# Patient Record
Sex: MALE | Race: WHITE | URBAN - METROPOLITAN AREA
[De-identification: names, ages, dates, MRNs, and addresses within clinical notes are randomized per-mention and may not be internally consistent; named-entity substitution may affect disease eponyms.]

---

## 2019-03-20 ENCOUNTER — RECORDS - HEALTHEAST (OUTPATIENT)
Dept: LAB | Facility: CLINIC | Age: 59
End: 2019-03-20

## 2019-03-20 LAB
ANION GAP SERPL CALCULATED.3IONS-SCNC: 13 MMOL/L (ref 5–18)
BUN SERPL-MCNC: 18 MG/DL (ref 8–22)
CALCIUM SERPL-MCNC: 9.4 MG/DL (ref 8.5–10.5)
CHLORIDE BLD-SCNC: 104 MMOL/L (ref 98–107)
CHOLEST SERPL-MCNC: 137 MG/DL
CO2 SERPL-SCNC: 25 MMOL/L (ref 22–31)
CREAT SERPL-MCNC: 1.06 MG/DL (ref 0.7–1.3)
FASTING STATUS PATIENT QL REPORTED: NO
GFR SERPL CREATININE-BSD FRML MDRD: >60 ML/MIN/1.73M2
GLUCOSE BLD-MCNC: 91 MG/DL (ref 70–125)
HDLC SERPL-MCNC: 41 MG/DL
LDLC SERPL CALC-MCNC: 68 MG/DL
POTASSIUM BLD-SCNC: 3.7 MMOL/L (ref 3.5–5)
PSA SERPL-MCNC: <0.1 NG/ML (ref 0–4.5)
SODIUM SERPL-SCNC: 142 MMOL/L (ref 136–145)
TRIGL SERPL-MCNC: 142 MG/DL

## 2020-06-10 ENCOUNTER — RECORDS - HEALTHEAST (OUTPATIENT)
Dept: LAB | Facility: CLINIC | Age: 60
End: 2020-06-10

## 2020-06-10 LAB
ANION GAP SERPL CALCULATED.3IONS-SCNC: 10 MMOL/L (ref 5–18)
BUN SERPL-MCNC: 18 MG/DL (ref 8–22)
CALCIUM SERPL-MCNC: 9.7 MG/DL (ref 8.5–10.5)
CHLORIDE BLD-SCNC: 106 MMOL/L (ref 98–107)
CHOLEST SERPL-MCNC: 108 MG/DL
CO2 SERPL-SCNC: 25 MMOL/L (ref 22–31)
CREAT SERPL-MCNC: 1.02 MG/DL (ref 0.7–1.3)
FASTING STATUS PATIENT QL REPORTED: ABNORMAL
GFR SERPL CREATININE-BSD FRML MDRD: >60 ML/MIN/1.73M2
GLUCOSE BLD-MCNC: 94 MG/DL (ref 70–125)
HDLC SERPL-MCNC: 37 MG/DL
LDLC SERPL CALC-MCNC: 48 MG/DL
POTASSIUM BLD-SCNC: 3.7 MMOL/L (ref 3.5–5)
SODIUM SERPL-SCNC: 141 MMOL/L (ref 136–145)
TRIGL SERPL-MCNC: 113 MG/DL

## 2021-06-03 ENCOUNTER — RECORDS - HEALTHEAST (OUTPATIENT)
Dept: ADMINISTRATIVE | Facility: CLINIC | Age: 61
End: 2021-06-03

## 2021-06-11 ENCOUNTER — RECORDS - HEALTHEAST (OUTPATIENT)
Dept: LAB | Facility: CLINIC | Age: 61
End: 2021-06-11

## 2021-06-11 LAB
ANION GAP SERPL CALCULATED.3IONS-SCNC: 11 MMOL/L (ref 5–18)
BUN SERPL-MCNC: 17 MG/DL (ref 8–22)
CALCIUM SERPL-MCNC: 8.8 MG/DL (ref 8.5–10.5)
CHLORIDE BLD-SCNC: 103 MMOL/L (ref 98–107)
CHOLEST SERPL-MCNC: 139 MG/DL
CO2 SERPL-SCNC: 26 MMOL/L (ref 22–31)
CREAT SERPL-MCNC: 0.98 MG/DL (ref 0.7–1.3)
FASTING STATUS PATIENT QL REPORTED: ABNORMAL
GFR SERPL CREATININE-BSD FRML MDRD: >60 ML/MIN/1.73M2
GLUCOSE BLD-MCNC: 95 MG/DL (ref 70–125)
HDLC SERPL-MCNC: 33 MG/DL
LDLC SERPL CALC-MCNC: 71 MG/DL
POTASSIUM BLD-SCNC: 3.9 MMOL/L (ref 3.5–5)
SODIUM SERPL-SCNC: 140 MMOL/L (ref 136–145)
TRIGL SERPL-MCNC: 176 MG/DL

## 2022-06-22 ENCOUNTER — LAB REQUISITION (OUTPATIENT)
Dept: LAB | Facility: CLINIC | Age: 62
End: 2022-06-22

## 2022-06-22 DIAGNOSIS — I10 ESSENTIAL (PRIMARY) HYPERTENSION: ICD-10-CM

## 2022-06-22 DIAGNOSIS — E78.5 HYPERLIPIDEMIA, UNSPECIFIED: ICD-10-CM

## 2022-06-22 PROCEDURE — 80061 LIPID PANEL: CPT | Performed by: FAMILY MEDICINE

## 2022-06-22 PROCEDURE — 80048 BASIC METABOLIC PNL TOTAL CA: CPT | Performed by: FAMILY MEDICINE

## 2022-06-23 LAB
ANION GAP SERPL CALCULATED.3IONS-SCNC: 9 MMOL/L (ref 5–18)
BUN SERPL-MCNC: 16 MG/DL (ref 8–22)
CALCIUM SERPL-MCNC: 9.5 MG/DL (ref 8.5–10.5)
CHLORIDE BLD-SCNC: 103 MMOL/L (ref 98–107)
CHOLEST SERPL-MCNC: 138 MG/DL
CO2 SERPL-SCNC: 28 MMOL/L (ref 22–31)
CREAT SERPL-MCNC: 0.95 MG/DL (ref 0.7–1.3)
FASTING STATUS PATIENT QL REPORTED: NORMAL
GFR SERPL CREATININE-BSD FRML MDRD: 89 ML/MIN/1.73M2
GLUCOSE BLD-MCNC: 91 MG/DL (ref 70–125)
HDLC SERPL-MCNC: 43 MG/DL
LDLC SERPL CALC-MCNC: 66 MG/DL
POTASSIUM BLD-SCNC: 3.8 MMOL/L (ref 3.5–5)
SODIUM SERPL-SCNC: 140 MMOL/L (ref 136–145)
TRIGL SERPL-MCNC: 145 MG/DL

## 2023-08-02 ENCOUNTER — LAB REQUISITION (OUTPATIENT)
Dept: LAB | Facility: CLINIC | Age: 63
End: 2023-08-02

## 2023-08-02 DIAGNOSIS — I10 ESSENTIAL (PRIMARY) HYPERTENSION: ICD-10-CM

## 2023-08-02 LAB
ANION GAP SERPL CALCULATED.3IONS-SCNC: 13 MMOL/L (ref 7–15)
BUN SERPL-MCNC: 19.2 MG/DL (ref 8–23)
CALCIUM SERPL-MCNC: 9.9 MG/DL (ref 8.8–10.2)
CHLORIDE SERPL-SCNC: 103 MMOL/L (ref 98–107)
CHOLEST SERPL-MCNC: 152 MG/DL
CREAT SERPL-MCNC: 1.05 MG/DL (ref 0.67–1.17)
DEPRECATED HCO3 PLAS-SCNC: 25 MMOL/L (ref 22–29)
GFR SERPL CREATININE-BSD FRML MDRD: 79 ML/MIN/1.73M2
GLUCOSE SERPL-MCNC: 96 MG/DL (ref 70–99)
HDLC SERPL-MCNC: 47 MG/DL
LDLC SERPL CALC-MCNC: 85 MG/DL
NONHDLC SERPL-MCNC: 105 MG/DL
POTASSIUM SERPL-SCNC: 3.9 MMOL/L (ref 3.4–5.3)
SODIUM SERPL-SCNC: 141 MMOL/L (ref 136–145)
TRIGL SERPL-MCNC: 101 MG/DL

## 2023-08-02 PROCEDURE — 80048 BASIC METABOLIC PNL TOTAL CA: CPT | Performed by: FAMILY MEDICINE

## 2023-08-02 PROCEDURE — 80061 LIPID PANEL: CPT | Performed by: FAMILY MEDICINE

## 2023-12-07 ENCOUNTER — TRANSFERRED RECORDS (OUTPATIENT)
Dept: HEALTH INFORMATION MANAGEMENT | Facility: CLINIC | Age: 63
End: 2023-12-07

## 2024-12-09 ENCOUNTER — TRANSFERRED RECORDS (OUTPATIENT)
Dept: HEALTH INFORMATION MANAGEMENT | Facility: CLINIC | Age: 64
End: 2024-12-09

## 2024-12-10 ENCOUNTER — MEDICAL CORRESPONDENCE (OUTPATIENT)
Dept: HEALTH INFORMATION MANAGEMENT | Facility: CLINIC | Age: 64
End: 2024-12-10

## 2024-12-17 ENCOUNTER — TRANSCRIBE ORDERS (OUTPATIENT)
Dept: OTHER | Age: 64
End: 2024-12-17

## 2024-12-17 ENCOUNTER — TRANSFERRED RECORDS (OUTPATIENT)
Dept: HEALTH INFORMATION MANAGEMENT | Facility: CLINIC | Age: 64
End: 2024-12-17

## 2024-12-17 ENCOUNTER — PATIENT OUTREACH (OUTPATIENT)
Dept: ONCOLOGY | Facility: CLINIC | Age: 64
End: 2024-12-17

## 2024-12-17 DIAGNOSIS — D75.1 POLYCYTHEMIA: Primary | ICD-10-CM

## 2024-12-17 NOTE — PROGRESS NOTES
New Patient Oncology Nurse Navigator Note     Referral Received: 12/17/24      Referring provider: Marcus Vega MD     Referring Clinic/Organization: Other - [facility]      Referred to: Benign Hematology    Requested provider (if applicable): First available - did not specify      Evaluation for :   Diagnosis   D75.1 (ICD-10-CM) - Polycythemia      Clinical History (per Nurse review of records provided):          Records Location: No records received     Records Needed:     Labs and OV notes from referring.    Additional testing needed prior to consult:     ?    Referral updates and Plan:     12/17/2024 3:07 PM -  Referral received and reviewed. Records requested.  Sent to NPS to schedule next available.     María Elena Suresh, DINESHN, RN  Hematology/Oncology Nurse Navigator  [facility]  [phone] / [phone]

## 2024-12-18 ENCOUNTER — PRE VISIT (OUTPATIENT)
Dept: ONCOLOGY | Facility: CLINIC | Age: 64
End: 2024-12-18

## 2024-12-18 NOTE — TELEPHONE ENCOUNTER
Action December 19, 2024 8:05 AM ABT   Action Taken Records from [facility] Source received and sent to HIM for upload and NN email for review     Action December 18, 2024 10:19 AM ABT   Action Taken Called [facility] [phone] - option 6 medical records - spoke to Ameya, informed and instructed to fax request to [facility] fax number [phone]     RECORDS STATUS - ALL OTHER DIAGNOSIS      RECORDS RECEIVED FROM: [facility]   NOTES STATUS DETAILS   OFFICE NOTE from referring provider External: [facility] 12/09/24: Dr. Marcus Vega   MEDICATION LIST External: [facility]    LABS     PATHOLOGY REPORTS     ANYTHING RELATED TO DIAGNOSIS External: [facility] Most recent 12/09/24

## 2024-12-20 PROBLEM — Z80.0 FAMILY HISTORY OF COLON CANCER: Status: RESOLVED | Noted: 2024-12-20 | Resolved: 2024-12-20

## 2024-12-20 PROBLEM — Z80.0 FAMILY HISTORY OF COLON CANCER: Status: ACTIVE | Noted: 2024-12-20

## 2025-04-04 ENCOUNTER — TRANSFERRED RECORDS (OUTPATIENT)
Dept: HEALTH INFORMATION MANAGEMENT | Facility: CLINIC | Age: 65
End: 2025-04-04
Payer: COMMERCIAL

## 2025-04-07 ENCOUNTER — TRANSFERRED RECORDS (OUTPATIENT)
Dept: HEALTH INFORMATION MANAGEMENT | Facility: CLINIC | Age: 65
End: 2025-04-07
Payer: COMMERCIAL

## 2025-05-13 ENCOUNTER — TRANSCRIBE ORDERS (OUTPATIENT)
Dept: OTHER | Age: 65
End: 2025-05-13

## 2025-05-13 DIAGNOSIS — N39.3 STRESS INCONTINENCE (FEMALE) (MALE): Primary | ICD-10-CM

## 2025-06-09 ENCOUNTER — ONCOLOGY VISIT (OUTPATIENT)
Dept: ONCOLOGY | Facility: HOSPITAL | Age: 65
End: 2025-06-09
Attending: INTERNAL MEDICINE
Payer: COMMERCIAL

## 2025-06-09 ENCOUNTER — LAB (OUTPATIENT)
Dept: INFUSION THERAPY | Facility: HOSPITAL | Age: 65
End: 2025-06-09
Attending: INTERNAL MEDICINE
Payer: COMMERCIAL

## 2025-06-09 VITALS
BODY MASS INDEX: 27.89 KG/M2 | DIASTOLIC BLOOD PRESSURE: 92 MMHG | OXYGEN SATURATION: 97 % | SYSTOLIC BLOOD PRESSURE: 121 MMHG | TEMPERATURE: 97.2 F | RESPIRATION RATE: 16 BRPM | WEIGHT: 229 LBS | HEIGHT: 76 IN | HEART RATE: 55 BPM

## 2025-06-09 DIAGNOSIS — D75.1 POLYCYTHEMIA: ICD-10-CM

## 2025-06-09 DIAGNOSIS — D75.1 POLYCYTHEMIA: Primary | ICD-10-CM

## 2025-06-09 LAB
BASOPHILS # BLD AUTO: 0.1 10E3/UL (ref 0–0.2)
BASOPHILS NFR BLD AUTO: 1 %
EOSINOPHIL # BLD AUTO: 0.3 10E3/UL (ref 0–0.7)
EOSINOPHIL NFR BLD AUTO: 6 %
ERYTHROCYTE [DISTWIDTH] IN BLOOD BY AUTOMATED COUNT: 12.9 % (ref 10–15)
HCT VFR BLD AUTO: 48.2 % (ref 40–53)
HGB BLD-MCNC: 16.5 G/DL (ref 13.3–17.7)
IMM GRANULOCYTES # BLD: 0 10E3/UL
IMM GRANULOCYTES NFR BLD: 0 %
LYMPHOCYTES # BLD AUTO: 2.2 10E3/UL (ref 0.8–5.3)
LYMPHOCYTES NFR BLD AUTO: 36 %
MCH RBC QN AUTO: 30.2 PG (ref 26.5–33)
MCHC RBC AUTO-ENTMCNC: 34.2 G/DL (ref 31.5–36.5)
MCV RBC AUTO: 88 FL (ref 78–100)
MONOCYTES # BLD AUTO: 0.5 10E3/UL (ref 0–1.3)
MONOCYTES NFR BLD AUTO: 8 %
NEUTROPHILS # BLD AUTO: 2.9 10E3/UL (ref 1.6–8.3)
NEUTROPHILS NFR BLD AUTO: 49 %
NRBC # BLD AUTO: 0 10E3/UL
NRBC BLD AUTO-RTO: 0 /100
PLATELET # BLD AUTO: 192 10E3/UL (ref 150–450)
RBC # BLD AUTO: 5.46 10E6/UL (ref 4.4–5.9)
WBC # BLD AUTO: 5.9 10E3/UL (ref 4–11)

## 2025-06-09 PROCEDURE — G0452 MOLECULAR PATHOLOGY INTERPR: HCPCS | Mod: 26 | Performed by: PATHOLOGY

## 2025-06-09 PROCEDURE — 81339 MPL GENE SEQ ALYS EXON 10: CPT | Performed by: INTERNAL MEDICINE

## 2025-06-09 PROCEDURE — 81270 JAK2 GENE: CPT | Performed by: INTERNAL MEDICINE

## 2025-06-09 PROCEDURE — 99204 OFFICE O/P NEW MOD 45 MIN: CPT | Performed by: INTERNAL MEDICINE

## 2025-06-09 PROCEDURE — 36415 COLL VENOUS BLD VENIPUNCTURE: CPT

## 2025-06-09 PROCEDURE — 85025 COMPLETE CBC W/AUTO DIFF WBC: CPT

## 2025-06-09 PROCEDURE — G0463 HOSPITAL OUTPT CLINIC VISIT: HCPCS | Performed by: INTERNAL MEDICINE

## 2025-06-09 RX ORDER — AZELASTINE HYDROCHLORIDE 137 UG/1
SPRAY, METERED NASAL PRN
COMMUNITY
Start: 2024-10-17

## 2025-06-09 RX ORDER — VALSARTAN 40 MG/1
40 TABLET ORAL DAILY
COMMUNITY
Start: 2010-01-01

## 2025-06-09 RX ORDER — MULTIVITAMIN
1 TABLET ORAL DAILY
COMMUNITY

## 2025-06-09 RX ORDER — TOLTERODINE 2 MG/1
2 CAPSULE, EXTENDED RELEASE ORAL DAILY
COMMUNITY

## 2025-06-09 RX ORDER — ATORVASTATIN CALCIUM 80 MG/1
80 TABLET, FILM COATED ORAL DAILY
COMMUNITY
Start: 2010-01-01

## 2025-06-09 ASSESSMENT — PAIN SCALES - GENERAL: PAINLEVEL_OUTOF10: NO PAIN (0)

## 2025-06-09 NOTE — PROGRESS NOTES
"Oncology Rooming Note    June 9, 2025 11:15 AM   Juan Curran is a 65 year old male who presents for:    Chief Complaint   Patient presents with    Hematology     New consult Polycythemia      Initial Vitals: BP (!) 121/92 (BP Location: Right arm, Patient Position: Sitting, Cuff Size: Adult Regular)   Pulse 55   Temp 97.2  F (36.2  C) (Tympanic)   Resp 16   Ht 1.93 m (6' 4\")   Wt 103.9 kg (229 lb)   SpO2 97%   BMI 27.87 kg/m   Estimated body mass index is 27.87 kg/m  as calculated from the following:    Height as of this encounter: 1.93 m (6' 4\").    Weight as of this encounter: 103.9 kg (229 lb). Body surface area is 2.36 meters squared.  No Pain (0) Comment: Data Unavailable   No LMP for male patient.  Allergies reviewed: Yes  Medications reviewed: Yes    Medications: Medication refills not needed today.  Pharmacy name entered into [employer]: [id number] - [place], [place] - [address]    Frailty Screening:   Is the patient here for a new oncology consult visit in cancer care? 1. Yes. Over the past month, have you experienced difficulty or required a caregiver to assist with:   1. Balance, walking or general mobility (including any falls)? NO  2. Completion of self-care tasks such as bathing, dressing, toileting, grooming/hygiene?  NO  3. Concentration or memory that affects your daily life?  NO     PHQ9:  Did this patient require a PHQ9?: No      Clinical concerns:  new consult polycythemia       Esha Bowman            "

## 2025-06-09 NOTE — PROGRESS NOTES
[facility] Hematology and Oncology Consult Note    Patient: Juan Curran  MRN: [id number]  Date of Service: Jun 9, 2025       Reason for Visit    Chief Complaint   Patient presents with    Hematology     New consult Polycythemia              ECOG Performance 0 - Independent        _____________________________________________________________________________    History Of Present Illness    Mr. Juan Curran is a very pleasant 65 year old male who has been referred to us because of an elevated hemoglobin.  According to the patient he has noticed an elevated hemoglobin for the last few years and it has been around the 18 g range.  Patient has had a diagnosis of obstructive sleep sleep apnea for many years and uses CPAP machine on a regular basis.  He otherwise is in good health and denies any other concerns.  He has no history of smoking and no history of heart disease.  His appetite is good his energy level is fine he is active and spends time in the gym 4 to 5 days a week        Review of systems.  Apart from describing in history, the remainder of comprehensive ROS was negative.    Past History    No past medical history on file.  No past surgical history on file.  No family history on file.  Social History     Socioeconomic History    Marital status:      Social Drivers of Health     Financial Resource Strain: Low Risk  (2/9/2022)    Received from [facility]    Overall Financial Resource Strain (CARDIA)     Difficulty of Paying Living Expenses: Not hard at all   Food Insecurity: No Food Insecurity (10/24/2024)    Received from [facility]    Hunger Vital Sign     Worried About Running Out of Food in the Last Year: Never true     Ran Out of Food in the Last Year: Never true   Transportation Needs: No Transportation Needs (10/24/2024)    Received from [facility]    PRAPARE - Transportation     Lack of Transportation (Medical): No     Lack of Transportation (Non-Medical): No   Physical Activity:  "Sufficiently Active (10/24/2024)    Received from [facility]    Exercise Vital Sign     Days of Exercise per Week: 4 days     Minutes of Exercise per Session: 60 min   Stress: No Stress Concern Present (2/9/2022)    Received from [facility]    [place] [place] of [employer] - Occupational Stress Questionnaire     Feeling of Stress : Not at all   Social Connections: Unknown (9/18/2022)    Received from [facility]    Social Connection and Isolation Panel [NHANES]     Frequency of Communication with Friends and Family: More than three times a week     Frequency of Social Gatherings with Friends and Family: More than three times a week     Attends Moravian Services: More than 4 times per year     Attends Club or Organization Meetings: 1 to 4 times per year   Interpersonal Safety: Not At Risk (2/9/2022)    Received from [facility]    Humiliation, Afraid, Rape, and Kick questionnaire     Fear of Current or Ex-Partner: No     Emotionally Abused: No     Physically Abused: No     Sexually Abused: No   Housing Stability: Low Risk  (10/24/2024)    Received from [facility]    Housing Stability     What is your living situation today?: I have a steady place to live       Allergies    Allergies   Allergen Reactions    Pollen Extract Difficulty breathing     Congestion, sneezing.    Seasonal Allergies        Physical Exam    BP (!) 121/92 (BP Location: Right arm, Patient Position: Sitting, Cuff Size: Adult Regular)   Pulse 55   Temp 97.2  F (36.2  C) (Tympanic)   Resp 16   Ht 1.93 m (6' 4\")   Wt 103.9 kg (229 lb)   SpO2 97%   BMI 27.87 kg/m      General: alert, awake, not in acute distress  HEENT: Head: Normal, normocephalic, atraumatic.  Eye: Normal external eye, conjunctiva, lids cornea, SAMANTHA.  Nose: Normal external nose, mucus membranes and septum.  Pharynx: Normal buccal mucosa. Normal pharynx.  Neck / Thyroid: Supple, no masses, nodes, nodules or enlargement.  Lymphatics: No abnormally enlarged lymph " nodes.  Chest: Normal chest wall and respirations. Clear to auscultation.  Heart: S1 S2 RRR, no murmur.   Abdomen: abdomen is soft without significant tenderness, masses, organomegaly or guarding  Extremities: normal strength, tone, and muscle mass  Skin: normal. no rash or abnormalities  CNS: non focal.    Lab Results    No results found for this or any previous visit (from the past week).    Imaging Results    No results found.        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  There is an order CBC in the system from September when it was 17 and again the white count and the platelet count was in the low normal range.  The etiology of his polycythemia was discussed with the patient in detail.  Patient does has obstructive sleep apnea even though he wears a CPAP machine religiously I do think that the elevated hemoglobin most likely due to that.  I will however like to rule out primary polycythemia rubra vera and do a JAK2 mutation panel.  If that is negative no further intervention will be needed and we will discharge him from the hematology service.  This was all discussed with him and his questions were answered.  Will draw those labs today and we will call him with the results.  Follow-up will depend on the results of the above-mentioned testing.  Patient was given an opportunity to answer questions which were answered to his satisfaction.        Signed by: Ata Sepulveda MD

## 2025-06-09 NOTE — LETTER
"6/9/2025      Juan Curran  [address] [place] [address]      Dear Colleague,    Thank you for referring your patient, Juan Curran, to the [facility]. Please see a copy of my visit note below.    Oncology Rooming Note    June 9, 2025 11:15 AM   Juan Curran is a 65 year old male who presents for:    Chief Complaint   Patient presents with     Hematology     New consult Polycythemia      Initial Vitals: BP (!) 121/92 (BP Location: Right arm, Patient Position: Sitting, Cuff Size: Adult Regular)   Pulse 55   Temp 97.2  F (36.2  C) (Tympanic)   Resp 16   Ht 1.93 m (6' 4\")   Wt 103.9 kg (229 lb)   SpO2 97%   BMI 27.87 kg/m   Estimated body mass index is 27.87 kg/m  as calculated from the following:    Height as of this encounter: 1.93 m (6' 4\").    Weight as of this encounter: 103.9 kg (229 lb). Body surface area is 2.36 meters squared.  No Pain (0) Comment: Data Unavailable   No LMP for male patient.  Allergies reviewed: Yes  Medications reviewed: Yes    Medications: Medication refills not needed today.  Pharmacy name entered into [employer]: [id number] - [place], [place] - [address]    Frailty Screening:   Is the patient here for a new oncology consult visit in cancer care? 1. Yes. Over the past month, have you experienced difficulty or required a caregiver to assist with:   1. Balance, walking or general mobility (including any falls)? NO  2. Completion of self-care tasks such as bathing, dressing, toileting, grooming/hygiene?  NO  3. Concentration or memory that affects your daily life?  NO     PHQ9:  Did this patient require a PHQ9?: No      Clinical concerns:  new consult polycythemia       Esha Bowman              [facility] Hematology and Oncology Consult Note    Patient: Juan Curran  MRN: [id number]  Date of Service: Jun 9, 2025       Reason for Visit    Chief Complaint   Patient presents with     Hematology     New consult Polycythemia  "             ECOG Performance 0 - Independent        _____________________________________________________________________________    History Of Present Illness    Mr. Juan Curran is a very pleasant 65 year old male who has been referred to us because of an elevated hemoglobin.  According to the patient he has noticed an elevated hemoglobin for the last few years and it has been around the 18 g range.  Patient has had a diagnosis of obstructive sleep sleep apnea for many years and uses CPAP machine on a regular basis.  He otherwise is in good health and denies any other concerns.  He has no history of smoking and no history of heart disease.  His appetite is good his energy level is fine he is active and spends time in the gym 4 to 5 days a week        Review of systems.  Apart from describing in history, the remainder of comprehensive ROS was negative.    Past History    No past medical history on file.  No past surgical history on file.  No family history on file.  Social History     Socioeconomic History     Marital status:      Social Drivers of Health     Financial Resource Strain: Low Risk  (2/9/2022)    Received from [facility]    Overall Financial Resource Strain (CARDIA)      Difficulty of Paying Living Expenses: Not hard at all   Food Insecurity: No Food Insecurity (10/24/2024)    Received from [facility]    Hunger Vital Sign      Worried About Running Out of Food in the Last Year: Never true      Ran Out of Food in the Last Year: Never true   Transportation Needs: No Transportation Needs (10/24/2024)    Received from [facility]    PRAPARE - Transportation      Lack of Transportation (Medical): No      Lack of Transportation (Non-Medical): No   Physical Activity: Sufficiently Active (10/24/2024)    Received from [facility]    Exercise Vital Sign      Days of Exercise per Week: 4 days      Minutes of Exercise per Session: 60 min   Stress: No Stress Concern Present (2/9/2022)    Received from  "[facility]    [employer] of Occupational Health - Occupational Stress Questionnaire      Feeling of Stress : Not at all   Social Connections: Unknown (9/18/2022)    Received from [facility]    Social Connection and Isolation Panel [NHANES]      Frequency of Communication with Friends and Family: More than three times a week      Frequency of Social Gatherings with Friends and Family: More than three times a week      Attends Taoism Services: More than 4 times per year      Attends Club or Organization Meetings: 1 to 4 times per year   Interpersonal Safety: Not At Risk (2/9/2022)    Received from [facility]    Humiliation, Afraid, Rape, and Kick questionnaire      Fear of Current or Ex-Partner: No      Emotionally Abused: No      Physically Abused: No      Sexually Abused: No   Housing Stability: Low Risk  (10/24/2024)    Received from [facility]    Housing Stability      What is your living situation today?: I have a steady place to live       Allergies    Allergies   Allergen Reactions     Pollen Extract Difficulty breathing     Congestion, sneezing.     Seasonal Allergies        Physical Exam    BP (!) 121/92 (BP Location: Right arm, Patient Position: Sitting, Cuff Size: Adult Regular)   Pulse 55   Temp 97.2  F (36.2  C) (Tympanic)   Resp 16   Ht 1.93 m (6' 4\")   Wt 103.9 kg (229 lb)   SpO2 97%   BMI 27.87 kg/m      General: alert, awake, not in acute distress  HEENT: Head: Normal, normocephalic, atraumatic.  Eye: Normal external eye, conjunctiva, lids cornea, SAMANTHA.  Nose: Normal external nose, mucus membranes and septum.  Pharynx: Normal buccal mucosa. Normal pharynx.  Neck / Thyroid: Supple, no masses, nodes, nodules or enlargement.  Lymphatics: No abnormally enlarged lymph nodes.  Chest: Normal chest wall and respirations. Clear to auscultation.  Heart: S1 S2 RRR, no murmur.   Abdomen: abdomen is soft without significant tenderness, masses, organomegaly or guarding  Extremities: normal " strength, tone, and muscle mass  Skin: normal. no rash or abnormalities  CNS: non focal.    Lab Results    No results found for this or any previous visit (from the past week).    Imaging Results    No results found.        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  There is an order CBC in the system from September when it was 17 and again the white count and the platelet count was in the low normal range.  The etiology of his polycythemia was discussed with the patient in detail.  Patient does has obstructive sleep apnea even though he wears a CPAP machine religiously I do think that the elevated hemoglobin most likely due to that.  I will however like to rule out primary polycythemia rubra vera and do a JAK2 mutation panel.  If that is negative no further intervention will be needed and we will discharge him from the hematology service.  This was all discussed with him and his questions were answered.  Will draw those labs today and we will call him with the results.  Follow-up will depend on the results of the above-mentioned testing.  Patient was given an opportunity to answer questions which were answered to his satisfaction.        Signed by: Ata Sepulveda MD     Again, thank you for allowing me to participate in the care of your patient.        Sincerely,        Ata Sepulveda MD    Electronically signed

## 2025-06-18 LAB
INTERPRETATION SERPL IEP-IMP: NORMAL
LAB TEST RESULTS REPORTED IN RPTPERIOD: NORMAL
SEQUENCING METHOD PNL BLD/T: NORMAL
SPECIMEN TYPE: NORMAL

## 2025-06-20 ENCOUNTER — PATIENT OUTREACH (OUTPATIENT)
Dept: ONCOLOGY | Facility: HOSPITAL | Age: 65
End: 2025-06-20
Payer: COMMERCIAL

## 2025-06-20 NOTE — PROGRESS NOTES
"[facility]/Voicemail    Clinical Data: Care Coordinator Outreach    Outreach attempted x 1.  Left message on patient's voicemail with call back information and requested return call.    Plan: Care Coordinator will try to reach patient again in 3-5 business days.    Of Note: Dr. Sepulveda was able to review Juan's lab results and states \"CAN 2 CALr MPL negative. No further follow-up required in hematoilogy\".    Geni Puentes RN  06/20/25  3:03 PM     "

## 2025-06-23 NOTE — PROGRESS NOTES
"Incoming call from Juan.     Informed Juan that Dr. Sepulveda was able to review his test results. Per Dr. Sepulveda: \"CAN 2 CALr MPL negative. No further follow-up required in hematoilogy\". Juan verbalizes understanding and thanks this writer for the \"good news\". Encouraged Juan to reach out if any needs arise in the future. Juan is agreeable to this.     Geni Puentes RN  06/23/25  11:44 AM     "

## 2025-06-30 ENCOUNTER — THERAPY VISIT (OUTPATIENT)
Dept: PHYSICAL THERAPY | Facility: CLINIC | Age: 65
End: 2025-06-30
Payer: COMMERCIAL

## 2025-06-30 DIAGNOSIS — N39.3 STRESS INCONTINENCE OF URINE: Primary | ICD-10-CM

## 2025-06-30 PROCEDURE — 97161 PT EVAL LOW COMPLEX 20 MIN: CPT | Mod: GP

## 2025-06-30 PROCEDURE — 97110 THERAPEUTIC EXERCISES: CPT | Mod: GP

## 2025-06-30 PROCEDURE — 97530 THERAPEUTIC ACTIVITIES: CPT | Mod: GP

## 2025-06-30 NOTE — PROGRESS NOTES
PHYSICAL THERAPY EVALUATION  Type of Visit: Evaluation       Fall Risk Screen:  Have you fallen 2 or more times in the past year?: No  Have you fallen and had an injury in the past year?: No  Is patient receiving Physical Therapy Services?: Yes (for pelvic PT)    Subjective         Presenting condition or subjective complaint: incontinence    Patient presents to physical therapy for evaluation. Patient reports that symptoms began in 2008 after prostatectomy. He reports that symptoms have been getting progressively worse over the last few years, especially in the last 6 months. Reports was placed on medication with little improvement - although has been moving from a house to an apartment, so he has been doing a higher level of activity. Reports wearing 5 pads per day that are fairly full. He reports that he has tried kegels in the past with little improvement. His goal for PT would be to decrease how many pads he is using each day.    Date of onset: 05/13/25 (referral date)    Relevant medical history:     No past medical history on file.   Dates & types of surgery:    No past surgical history on file.   Prior diagnostic imaging/testing results:       Prior therapy history for the same diagnosis, illness or injury: Yes unknown    Prior Level of Function  Transfers: Independent  Ambulation: Independent  ADL: Independent  IADL:     Living Environment  Social support: With a significant other or spouse   Type of home: House   Stairs to enter the home: Yes 3 Is there a railing: Yes     Ramp: Yes   Stairs inside the home: Yes 12 Is there a railing: Yes     Help at home: None  Equipment owned:       Employment: No    Hobbies/Interests:      Patient goals for therapy: wear less pads per day    Pain assessment: Pain denied     Objective      PELVIC EVALUATION  ADDITIONAL HISTORY:  Sex assigned at birth: Male  Gender identity: Male    Pronouns: He/Him/His      Bladder History:  Patient denies pain with urination. Patient  reports that he feels completely empty, but will get another urge shortly after voiding. Reports decreased sense of urges since prostatectomy.  Feels bladder filling: No  Triggers for feeling of inability to wait to go to the bathroom: Yes milk, coke, alcohol  How long can you wait to urinate: 10 minutes, reports waiting a few hours between trips to the bathroom  Gets up at night to urinate: Yes 1  Can stop the flow of urine when urinating: Sometimes  Volume of urine usually released: Small   Other issues:     Number of bladder infections in last 12 months:    Fluid intake per day: 32 water 12 caffeine    Medications taken for bladder: No     Activities causing urine leak: Cough; Sneeze; Jump; Run; Hurrying to the bathroom due to a strong urge to urinate (pee)   heavier lifting, golfing, stress/nervousness, exercise, sudden movement  Amount of urine typically leaked:    Pads used to help with leaking: Yes I use this many pads per day: 5   I use this type/brand: assure      Bowel History:  Reports complete emptying with bowel movements for the last month. Reports that the new bladder medication did increase constipation - has been resolved for the past month.  Frequency of bowel movement: daily  Consistency of stool: Hard   type 5  Ignores the urge to defecate: No  Other bowel issues:   some slight straining with bowel movements  Length of time spent trying to have a bowel movement:      Sexual Function History:  Sexual orientation: Straight    Sexually active: Yes  Lubrication used: Yes Yes  Pelvic pain:       Pain or difficulty with orgasms/erection/ejaculation: No   does not report any difficulty achieving or maintaining erection  State of menopause:    Hormone medications: No      Are you currently pregnant: No  Have you been diagnosed with pelvic prolapse or abdominal separation: No  Do you get regular exercise: Yes  I do this type of exercise: aerobic  Have you tried pelvic floor strengthening exercises for 4  weeks: Yes  Do you have any history of trauma that is relevant to your care that you d like to share: No    Discussed reason for referral regarding pelvic health needs and external/internal pelvic floor muscle examination with patient/guardian.  Opportunity provided to ask questions and verbal consent for assessment and intervention was given.    PAIN: patient does not report pain with urinary symptoms, information below is related to incontinence  Pain Level at Rest: 0/10  Pain Level with Use: 5/10  Pain Location: stress urinary incontinence  Pain Quality: stress urinary incontinence  Pain Frequency: daily  Pain is Worst: daytime  Pain is Exacerbated By: lifting, golfing, coughing/sneezes  Pain is Relieved By: none  Pain Progression: Worsened    POSTURE: Standing Posture: Rounded shoulders, Forward head, Thoracic kyphosis increased  Sitting Posture: Rounded shoulders, Forward head, Thoracic kyphosis increased  LUMBAR SCREEN: AROM WFL  HIP SCREEN:  Strength: WNL   Functional Strength Testing:     PELVIC/SI SCREEN:     PAIN PROVOCATION TEST:   PELVIS/SI SPECIAL TESTS:   BREATHING SYMMETRY:     PELVIC EXAM  External Visual Inspection:  At rest: Normal  With voluntary pelvic floor contraction: Perineal elevation, Present  Relaxation of PFM: Yes  With intra-abdominal pressure: Bearing down as defecation: Perineal elevation    Integumentary:   Anal: Discharge    External Digital Palpation per Perineum:   Levator ani: Unremarkable  Coccyx: Unremarkable    Scar:   Location/Type:   Mobility:     Internal Digital Palpation:  Per Vagina:      Per Rectum:        Pelvic Organ Prolapse:       ABDOMINAL ASSESSMENT  Diastasis Rectus Abdominis (BLANE):      Abdominal Activation/Strength:     Scar:   Location/Type:   Mobility:     Fascial Tension/Restriction:     BIOFEEDBACK:  Position: Sidelying  Surface Electrodes: Perineal    Abdominals: Synergistic with pelvic floor    Perianals:   Baseline muscle activity: WNL, below 3.0 uV  Peak  Amplitude/MVC: see 2nd graph microvolts  Duration of Sustained Contraction: see graphs  Endurance Hold-Average mean amplitude/work average: see graphs  Endurance Hold-Baseline between contractions: see graphs, intermittently elevated  Quick Flicks-Average mean amplitude/work average: see graphs  Quick Flicks-Baseline between contractions: see graphs, intermittently elevated  Subjective Assessment: Time to command to Peak: see graphs  Subjective Assessment: Time to return to baseline muscle activity: see graphs    DERMATOMES:   DTR S:     Assessment & Plan   CLINICAL IMPRESSIONS  Medical Diagnosis: Stress incontinence (female) (male)    Treatment Diagnosis: pelvic floor muscle weakness, pelvic floor muscle incoordination   Impression/Assessment: Patient is a 65 year old male with mixed urinary incontinence complaints.  The following significant findings have been identified: Decreased ROM/flexibility, Decreased strength, Decreased proprioception, Impaired muscle performance, Decreased activity tolerance, and Impaired posture. These impairments interfere with their ability to perform self care tasks, recreational activities, household chores, household mobility, and community mobility as compared to previous level of function.     Clinical Decision Making (Complexity):  Clinical Presentation: Stable/Uncomplicated  Clinical Presentation Rationale: based on medical and personal factors listed in PT evaluation  Clinical Decision Making (Complexity): Low complexity    PLAN OF CARE  Treatment Interventions:  Modalities: Biofeedback, Ultrasound  Interventions: Manual Therapy, Neuromuscular Re-education, Therapeutic Activity, Therapeutic Exercise, Self-Care/Home Management    Long Term Goals     PT Goal 1  Goal Identifier: LTG1  Goal Description: Patient will demonstrate increase in pelvic floor muscle strength to decrease use of pads to 2 per day.  Rationale: to maximize safety and independence with performance of ADLs and  functional tasks  Goal Progress: currently 5 per day  Target Date: 09/27/25      Frequency of Treatment: 1x per week for 3 weeks, 1x every other week for 9 weeks  Duration of Treatment: 12 weeks    Recommended Referrals to Other Professionals:   Education Assessment:   Learner/Method: Patient    Risks and benefits of evaluation/treatment have been explained.   Patient/Family/caregiver agrees with Plan of Care.     Evaluation Time:     PT Eval, Low Complexity Minutes (35078): 20       Signing Clinician: Camron Carl, PT        [facility]                                                                                   OUTPATIENT PHYSICAL THERAPY      PLAN OF TREATMENT FOR OUTPATIENT REHABILITATION   Patient's Last Name, First Name, Jaun Wetzel YOB: 1960   Provider's Name   [facility]   Medical Record No.  [id number]     Onset Date: 05/13/25 (referral date)  Start of Care Date: 06/30/25     Medical Diagnosis:  Stress incontinence (female) (male)      PT Treatment Diagnosis:  pelvic floor muscle weakness, pelvic floor muscle incoordination Plan of Treatment  Frequency/Duration: 1x per week for 3 weeks, 1x every other week for 9 weeks/ 12 weeks    Certification date from 06/30/25 to 09/27/25         See note for plan of treatment details and functional goals     Camron Carl, PT                         I CERTIFY THE NEED FOR THESE SERVICES FURNISHED UNDER        THIS PLAN OF TREATMENT AND WHILE UNDER MY CARE     (Physician attestation of this document indicates review and certification of the therapy plan).              Referring Provider:  Mile Ceron    Initial Assessment  See Epic Evaluation- Start of Care Date: 06/30/25

## 2025-07-07 ENCOUNTER — THERAPY VISIT (OUTPATIENT)
Dept: PHYSICAL THERAPY | Facility: CLINIC | Age: 65
End: 2025-07-07
Payer: COMMERCIAL

## 2025-07-07 DIAGNOSIS — N39.3 STRESS INCONTINENCE OF URINE: Primary | ICD-10-CM

## 2025-07-07 PROCEDURE — 97530 THERAPEUTIC ACTIVITIES: CPT | Mod: GP

## 2025-07-07 PROCEDURE — 97110 THERAPEUTIC EXERCISES: CPT | Mod: GP

## 2025-07-14 ENCOUNTER — THERAPY VISIT (OUTPATIENT)
Dept: PHYSICAL THERAPY | Facility: CLINIC | Age: 65
End: 2025-07-14
Payer: COMMERCIAL

## 2025-07-14 DIAGNOSIS — N39.3 STRESS INCONTINENCE OF URINE: Primary | ICD-10-CM

## 2025-07-14 PROCEDURE — 97110 THERAPEUTIC EXERCISES: CPT | Mod: GP
